# Patient Record
Sex: MALE | Race: BLACK OR AFRICAN AMERICAN | NOT HISPANIC OR LATINO | URBAN - METROPOLITAN AREA
[De-identification: names, ages, dates, MRNs, and addresses within clinical notes are randomized per-mention and may not be internally consistent; named-entity substitution may affect disease eponyms.]

---
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
## 2022-03-19 ENCOUNTER — EMERGENCY (EMERGENCY)
Facility: HOSPITAL | Age: 22
LOS: 0 days | Discharge: HOME | End: 2022-03-19
Attending: EMERGENCY MEDICINE | Admitting: EMERGENCY MEDICINE
Payer: COMMERCIAL

## 2022-03-19 VITALS
HEART RATE: 77 BPM | RESPIRATION RATE: 16 BRPM | HEIGHT: 71 IN | DIASTOLIC BLOOD PRESSURE: 84 MMHG | OXYGEN SATURATION: 98 % | TEMPERATURE: 97 F | SYSTOLIC BLOOD PRESSURE: 146 MMHG
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
## 2022-03-19 DIAGNOSIS — M25.572 PAIN IN LEFT ANKLE AND JOINTS OF LEFT FOOT: ICD-10-CM

## 2022-03-19 PROBLEM — Z78.9 OTHER SPECIFIED HEALTH STATUS: Chronic | Status: ACTIVE | Noted: 2021-06-12

## 2022-03-19 PROCEDURE — 73610 X-RAY EXAM OF ANKLE: CPT | Mod: 26,LT

## 2022-03-19 PROCEDURE — 99283 EMERGENCY DEPT VISIT LOW MDM: CPT

## 2022-03-19 PROCEDURE — 73630 X-RAY EXAM OF FOOT: CPT | Mod: 26,LT

## 2022-03-19 NOTE — ED PROVIDER NOTE - CLINICAL SUMMARY MEDICAL DECISION MAKING FREE TEXT BOX
Patient presented status post twisting his ankle while playing basketball.  Otherwise on arrival patient afebrile, hemodynamically stable, neurovascular intact, no deformity on exam.  Obtain x-rays which were negative for acute fracture dislocation.  Will Ace wrap and will provide outpatient follow-up with Ortho.  Patient is agreeable with plan. Agrees to return to ED for any new or worsening symptoms.

## 2022-03-19 NOTE — ED PROVIDER NOTE - PATIENT PORTAL LINK FT
You can access the FollowMyHealth Patient Portal offered by [facility] by registering at the following website: [email or web address]. By joining [email or web address]’s FollowMyHealth portal, you will also be able to view your health information using other applications (apps) compatible with our system.

## 2022-03-19 NOTE — ED PROVIDER NOTE - NS ED ROS FT
Constitutional: (-) fever (-) chills (-) (-) lightheadedness   Musculoskeletal: (-) neck pain, (-) back pain, (+) joint pain (-) joint swelling (+) painful ROM  Integumentary: (-) rash, (-) edema (-) lacerations (-) pruritis   Neurological: (-) headache, (-) altered mental status (-) LOC (-) dizziness (-) paresthesias (-) gait abnormalities

## 2022-03-19 NOTE — ED PROVIDER NOTE - OBJECTIVE STATEMENT
21 y.o. male no pmh, here for L ankle pain. Has sprained L ankle before in the past. Was playing basket ball, everted ankle while landing from jump shot. No paresthesias. + painful rom. + painful bearing weight.

## 2022-03-19 NOTE — ED PROVIDER NOTE - NS ED ATTENDING STATEMENT MOD
This was a shared visit with the TOYA. I reviewed and verified the documentation and independently performed the documented:

## 2022-03-19 NOTE — ED PROVIDER NOTE - NSFOLLOWUPINSTRUCTIONS_ED_ALL_ED_FT
MAKE AN APPOINTMENT WITH THE ORTHOPEDIST. REST, ICE YOUR ANKLE, TAKE MOTRIN AND TYLENOL. AVOID RIGOROUS ACTIVITY.      Ankle Pain    The ankle joint holds your body weight and allows you to move around. Ankle pain can occur on either side or the back of one ankle or both ankles. Ankle pain may be sharp and burning or dull and aching. There may be tenderness, stiffness, redness, or warmth around the ankle. Many things can cause ankle pain, including an injury to the area and overuse of the ankle.  Follow these instructions at home:    Activity     Rest your ankle as told by your health care provider. Avoid any activities that cause ankle pain.Do not use the injured limb to support your body weight until your health care provider says that you can. Use crutches as told by your health care provider.Do exercises as told by your health care provider.Ask your health care provider when it is safe to drive if you have a brace on your ankle.If you have a brace:     Wear the brace as told by your health care provider. Remove it only as told by your health care provider.Loosen the brace if your toes tingle, become numb, or turn cold and blue.Keep the brace clean.If the brace is not waterproof:  Do not let it get wet.Cover it with a watertight covering when you take a bath or shower.If you were given an elastic bandage:        Remove it when you take a bath or a shower.Try not to move your ankle very much, but wiggle your toes from time to time. This helps to prevent swelling.Adjust the bandage to make it more comfortable if it feels too tight.Loosen the bandage if you have numbness or tingling in your foot or if your foot turns cold and blue.Managing pain, stiffness, and swelling       If directed, put ice on the painful area.  If you have a removable brace or elastic bandage, remove it as told by your health care provider.Put ice in a plastic bag.Place a towel between your skin and the bag.Leave the ice on for 20 minutes, 2–3 times a day.Move your toes often to avoid stiffness and to lessen swelling.Raise (elevate) your ankle above the level of your heart while you are sitting or lying down.General instructions     Record information about your pain. Writing down the following may be helpful for you and your health care provider:  How often you have ankle pain.Where the pain is located.What the pain feels like.If treatment involves wearing a prescribed shoe or insole, make sure you wear it correctly and for as long as told by your health care provider.Take over-the-counter and prescription medicines only as told by your health care provider.Keep all follow-up visits as told by your health care provider. This is important.Contact a health care provider if:  Your pain gets worse.Your pain is not relieved with medicines.You have a fever or chills.You are having more trouble with walking.You have new symptoms.Get help right away if:  Your foot, leg, toes, or ankle:  Tingles or becomes numb.Becomes swollen.Turns pale or blue.Summary  Ankle pain can occur on either side or the back of one ankle or both ankles.Ankle pain may be sharp and burning or dull and aching.Rest your ankle as told by your health care provider. If told, apply ice to the area.Take over-the-counter and prescription medicines only as told by your health care provider.This information is not intended to replace advice given to you by your health care provider. Make sure you discuss any questions you have with your health care provider.    Document Released: 06/07/2011 Document Revised: 06/26/2019 Document Reviewed: 06/26/2019  [employer] Interactive Patient Education © 2019 [employer]

## 2022-03-19 NOTE — ED PROVIDER NOTE - PHYSICAL EXAMINATION
Physical Exam    Vital Signs: I have reviewed the initial vital signs.  Constitutional: well-nourished, appears stated age, no acute distress  card: pedal pulses intact b/l  Respiratory: unlabored respiratory effort, speaking in full sentences, handling oral secretions  Musculoskeletal: supple neck, no lower extremity edema,  no gross bony deformities or swelling.  Integumentary: warm, dry, no rashes, lacerations,  Neurologic: awake, alert, cranial nerves II-XII grossly intact, extremities’ motor and sensory functions grossly intact, no foot drop

## 2022-03-19 NOTE — ED PROVIDER NOTE - CARE PROVIDER_API CALL
Wesley Rucker Martin  Orthopedic Surgery  [address] [place] [id number]  Phone: [phone]  Fax: [phone]  Follow Up Time:

## 2022-03-24 PROBLEM — Z00.00 ENCOUNTER FOR PREVENTIVE HEALTH EXAMINATION: Status: ACTIVE | Noted: 2022-03-24

## 2024-12-17 NOTE — ED PROCEDURE NOTE - CPROC ED POST RADIOGRAPHY1
Detail Level: Detailed post procedure radiography not performed/extremity correctly positioned, splinted

## 2025-06-13 ENCOUNTER — EMERGENCY (EMERGENCY)
Facility: HOSPITAL | Age: 25
LOS: 0 days | Discharge: HOME | End: 2025-06-13
Attending: EMERGENCY MEDICINE
Payer: COMMERCIAL

## 2025-06-13 VITALS
WEIGHT: 194.01 LBS | DIASTOLIC BLOOD PRESSURE: 99 MMHG | RESPIRATION RATE: 18 BRPM | HEART RATE: 94 BPM | TEMPERATURE: 98 F | SYSTOLIC BLOOD PRESSURE: 146 MMHG | OXYGEN SATURATION: 99 %
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
## 2025-06-13 DIAGNOSIS — M25.572 PAIN IN LEFT ANKLE AND JOINTS OF LEFT FOOT: ICD-10-CM
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
## 2025-06-13 DIAGNOSIS — S99.912A UNSPECIFIED INJURY OF LEFT ANKLE, INITIAL ENCOUNTER: ICD-10-CM

## 2025-06-13 PROCEDURE — 73610 X-RAY EXAM OF ANKLE: CPT | Mod: LT

## 2025-06-13 PROCEDURE — 73610 X-RAY EXAM OF ANKLE: CPT | Mod: 26,LT

## 2025-06-13 PROCEDURE — 99284 EMERGENCY DEPT VISIT MOD MDM: CPT | Mod: 25

## 2025-06-13 PROCEDURE — 29515 APPLICATION SHORT LEG SPLINT: CPT | Mod: LT

## 2025-06-13 PROCEDURE — 99283 EMERGENCY DEPT VISIT LOW MDM: CPT | Mod: 25

## 2025-06-13 RX ORDER — IBUPROFEN 200 MG
600 TABLET ORAL ONCE
Refills: 0 | Status: COMPLETED | OUTPATIENT
Start: 2025-06-13 | End: 2025-06-13

## 2025-06-13 RX ADMIN — Medication 600 MILLIGRAM(S): at 21:21

## 2025-06-13 NOTE — ED PROVIDER NOTE - OBJECTIVE STATEMENT
23yo M no significant past medical history presenting for L ankle pain/swelling after eversion injury playing basketball today. no other injuries or acute complaints. no numbness/focal weakness. unable to WB.

## 2025-06-13 NOTE — ED PROVIDER NOTE - NSFOLLOWUPINSTRUCTIONS_ED_ALL_ED_FT
Our Emergency Department Referral Coordinators will be reaching out to you in the next 24-48 hours from 9:00am to 5:00pm to schedule a follow up appointment. Please expect a phone call from the hospital in that time frame. If you do not receive a call or if you have any questions or concerns, you can reach them at   [phone].

## 2025-06-13 NOTE — ED PROVIDER NOTE - PHYSICAL EXAMINATION
Constitutional: Well appearing. No acute distress. Non toxic.   Eyes: PERRLA. Extraocular movements intact, no entrapment. Conjunctiva normal.         Pulm:  Normal work of breathing.     Ext: Warm and well perfused x4, moving all extremities, 2+ equal pulses throughout <2sec capillary refill throughout ttp medial L ankle, assoc swelling. no other bony ttp throughout   Psy: Cooperative, appropriate.   Skin: Warm, dry, no rash

## 2025-06-13 NOTE — ED PROVIDER NOTE - CLINICAL SUMMARY MEDICAL DECISION MAKING FREE TEXT BOX
23yo M no significant past medical history presenting for L ankle pain/swelling after eversion injury playing basketball today. no other injuries or acute complaints. no numbness/focal weakness. unable to WB. imaging reviewed. pt splinted. Comfortable with discharge and follow-up outpatient, strict return precautions given. Endorses understanding of all of this and aware that they can return at any time for new or concerning symptoms. No further questions or concerns at this time

## 2025-06-13 NOTE — ED PROVIDER NOTE - PATIENT PORTAL LINK FT
You can access the FollowMyHealth Patient Portal offered by [facility] by registering at the following website: [email or web address]. By joining [email or web address]’s FollowMyHealth portal, you will also be able to view your health information using other applications (apps) compatible with our system.

## 2025-06-16 ENCOUNTER — APPOINTMENT (OUTPATIENT)
Dept: ORTHOPEDIC SURGERY | Facility: CLINIC | Age: 25
End: 2025-06-16
Payer: COMMERCIAL

## 2025-06-16 ENCOUNTER — RESULT CHARGE (OUTPATIENT)
Age: 25
End: 2025-06-16

## 2025-06-16 PROCEDURE — 73610 X-RAY EXAM OF ANKLE: CPT | Mod: LT

## 2025-06-16 PROCEDURE — 99203 OFFICE O/P NEW LOW 30 MIN: CPT

## 2025-07-07 ENCOUNTER — APPOINTMENT (OUTPATIENT)
Dept: ORTHOPEDIC SURGERY | Facility: CLINIC | Age: 25
End: 2025-07-07